# Patient Record
Sex: MALE | Race: WHITE
[De-identification: names, ages, dates, MRNs, and addresses within clinical notes are randomized per-mention and may not be internally consistent; named-entity substitution may affect disease eponyms.]

---
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
## 2019-09-30 ENCOUNTER — HOSPITAL ENCOUNTER (EMERGENCY)
Dept: HOSPITAL 25 - ED | Age: 19
Discharge: HOME | End: 2019-09-30
Payer: COMMERCIAL

## 2019-09-30 DIAGNOSIS — R05: ICD-10-CM

## 2019-09-30 DIAGNOSIS — R51: ICD-10-CM

## 2019-09-30 DIAGNOSIS — B34.9: Primary | ICD-10-CM

## 2019-09-30 LAB
ALBUMIN SERPL BCG-MCNC: 4.6 G/DL (ref 3.2–5.2)
ALBUMIN/GLOB SERPL: 1.7 {RATIO} (ref 1–3)
ALP SERPL-CCNC: 66 U/L (ref 34–104)
ALT SERPL W P-5'-P-CCNC: 31 U/L (ref 7–52)
ANION GAP SERPL CALC-SCNC: 6 MMOL/L (ref 2–11)
AST SERPL-CCNC: 46 U/L (ref 13–39)
BASOPHILS # BLD AUTO: 0 10^3/UL (ref 0–0.2)
BUN SERPL-MCNC: 14 MG/DL (ref 6–24)
BUN/CREAT SERPL: 15.6 (ref 8–20)
CALCIUM SERPL-MCNC: 9.5 MG/DL (ref 8.6–10.3)
CHLORIDE SERPL-SCNC: 103 MMOL/L (ref 101–111)
EOSINOPHIL # BLD AUTO: 0.2 10^3/UL (ref 0–0.6)
GLOBULIN SER CALC-MCNC: 2.7 G/DL (ref 2–4)
GLUCOSE SERPL-MCNC: 101 MG/DL (ref 70–100)
HCO3 SERPL-SCNC: 30 MMOL/L (ref 22–32)
HCT VFR BLD AUTO: 41 % (ref 42–52)
HGB BLD-MCNC: 14.6 G/DL (ref 14–18)
LYMPHOCYTES # BLD AUTO: 2.5 10^3/UL (ref 1–4.8)
MCH RBC QN AUTO: 31 PG (ref 27–31)
MCHC RBC AUTO-ENTMCNC: 35 G/DL (ref 31–36)
MCV RBC AUTO: 89 FL (ref 80–94)
MONOCYTES # BLD AUTO: 0.7 10^3/UL (ref 0–0.8)
NEUTROPHILS # BLD AUTO: 4.3 10^3/UL (ref 1.5–7.7)
NRBC # BLD AUTO: 0 10^3/UL
NRBC BLD QL AUTO: 0
PLATELET # BLD AUTO: 255 10^3/UL (ref 150–450)
POTASSIUM SERPL-SCNC: 3.7 MMOL/L (ref 3.5–5)
PROT SERPL-MCNC: 7.3 G/DL (ref 6.4–8.9)
RBC # BLD AUTO: 4.67 10^6 /UL (ref 4.18–5.48)
SODIUM SERPL-SCNC: 139 MMOL/L (ref 135–145)
WBC # BLD AUTO: 7.7 10^3/UL (ref 3.5–10.8)

## 2019-09-30 PROCEDURE — 99282 EMERGENCY DEPT VISIT SF MDM: CPT

## 2019-09-30 PROCEDURE — 93005 ELECTROCARDIOGRAM TRACING: CPT

## 2019-09-30 PROCEDURE — 80053 COMPREHEN METABOLIC PANEL: CPT

## 2019-09-30 PROCEDURE — 96374 THER/PROPH/DIAG INJ IV PUSH: CPT

## 2019-09-30 PROCEDURE — 36415 COLL VENOUS BLD VENIPUNCTURE: CPT

## 2019-09-30 PROCEDURE — 96361 HYDRATE IV INFUSION ADD-ON: CPT

## 2019-09-30 PROCEDURE — 85025 COMPLETE CBC W/AUTO DIFF WBC: CPT

## 2019-09-30 NOTE — ED
Headache





- HPI Summary


HPI Summary: 





Pt is a 20 y/o M presenting to the ED with a chief complaint of a headache. He 

states he has had cold symptoms recently, including rhinorrhea, congestion, 

cough, and sore throat, but this morning woke up with headache. He notes tunnel 

vision, spots in his vision, difficulty focusing, headache, and 

lightheadedness. He took two Advil and one dose of Mucinex today. Was advised 

by [facility] to come to ED. Currently has mild tension like headache a/w 

pressure behind his eyes. No neck pain, no fevers. 





- History Of Current Complaint


Chief Complaint: EDHeadache


Stated Complaint: HEADACHE/CONGESTED/SORE THROAT PER PT


Time Seen by Provider: 09/30/19 22:22


Hx Obtained From: Patient


Onset/Duration: Gradual Onset, Started hours ago, Still Present


Initially Headache Was: Moderate


Currently Pain Is: Moderate


Timing: Constant, Hours


Character: Typical Headache


Location of Headache: Diffuse


Aggravating Factor: Nothing


Allevating Factors: Nothing


Associated Signs And Symptoms: Visual Changes





- Allergies/Home Medications


Allergies/Adverse Reactions: 


 Allergies











Allergy/AdvReac Type Severity Reaction Status Date / Time


 


No Known Allergies Allergy   Verified 09/30/19 20:52














PMH/Surg Hx/FS Hx/Imm Hx


Previously Healthy: Yes


Endocrine/Hematology History: 


   Denies: Hx Diabetes


Cardiovascular History: 


   Denies: Hx Hypertension


Sensory History: 


   Denies: Hx Contacts or Glasses


Opthamlomology History: 


   Denies: Hx Contacts or Glasses





- Surgical History


Surgery Procedure, Year, and Place: None


Infectious Disease History: No


Infectious Disease History: 


   Denies: Traveled Outside the [place] in Last 30 Days





- Family History


Known Family History: 


   Negative: Cardiac Disease, Hypertension, Diabetes





- Social History


Alcohol Use: Occasionally


Hx Substance Use: No


Substance Use Type: Reports: None


Hx Tobacco Use: No


Smoking Status (MU): Never Smoked Tobacco





Review of Systems


Positive: Other - tunnel vision, spots in his vision


Positive: Sore Throat, Nasal Discharge, Other - congestion


Positive: Cough


Neurological: Other - dizziness, difficulty focusing


Positive: Headache


All Other Systems Reviewed And Are Negative: Yes





Physical Exam





- Summary


Physical Exam Summary: 





Constitutional: Well-developed, Well-nourished, Alert. (-) Distressed


Skin: Warm, Dry


HENT: Normocephalic; Atraumatic


Eyes: Conjunctiva normal, EOMI. PERRL. Visual acuity 20/20 bilaterally. 

Peripheral visual fields intact. 


Neck: Musculoskeletal ROM normal neck. (-) JVD, (-) Stridor, (-) Nuchal rigidity


Cardio: Rhythm regular, rate normal, Heart sounds normal; Intact distal pulses; 

Radial pulses are 2+ and symmetric. (-) Murmur


Pulmonary/Chest wall: Effort normal. (-) Respiratory distress, (-) Wheezes, (-) 

Rales


Abd: Soft, (-) tenderness, (-) Distension, (-) Guarding, (-) Rebound


Musculoskeletal: (-) Edema


Lymph: (-) Cervical adenopathy


Neuro: Alert, Oriented x3


Psych: Mood and affect Normal





Triage Information Reviewed: Yes


Vital Signs On Initial Exam: 


 Initial Vitals











Temp Pulse Resp BP Pulse Ox


 


 98.6 F   75   18   139/88   98 


 


 09/30/19 20:52  09/30/19 20:52  09/30/19 20:52  09/30/19 20:52  09/30/19 20:52











Vital Signs Reviewed: Yes





Diagnostics





- Vital Signs


 Vital Signs











  Temp Pulse Resp BP Pulse Ox


 


 09/30/19 20:52  98.6 F  75  18  139/88  98














- Laboratory


Lab Results: 


 Lab Results











  09/30/19 Range/Units





  22:32 


 


WBC  7.7  (3.5-10.8)  10^3/uL


 


RBC  4.67  (4.18-5.48)  10^6 /uL


 


Hgb  14.6  (14.0-18.0)  g/dL


 


Hct  41 L  (42-52)  %


 


MCV  89  (80-94)  fL


 


MCH  31  (27-31)  pg


 


MCHC  35  (31-36)  g/dL


 


RDW  13  (10-15)  %


 


Plt Count  255  (150-450)  10^3/uL


 


MPV  8.8  (7.4-10.4)  fL


 


Neut % (Auto)  55.4  %


 


Lymph % (Auto)  32.8  %


 


Mono % (Auto)  9.2  %


 


Eos % (Auto)  2.2  %


 


Baso % (Auto)  0.4  %


 


Absolute Neuts (auto)  4.3  (1.5-7.7)  10^3/ul


 


Absolute Lymphs (auto)  2.5  (1.0-4.8)  10^3/ul


 


Absolute Monos (auto)  0.7  (0-0.8)  10^3/ul


 


Absolute Eos (auto)  0.2  (0-0.6)  10^3/ul


 


Absolute Basos (auto)  0.0  (0-0.2)  10^3/ul


 


Absolute Nucleated RBC  0.0  10^3/ul


 


Nucleated RBC %  0.0  











Result Diagrams: 


 09/30/19 22:32





 09/30/19 22:33


Lab Statement: Any lab studies that have been ordered have been reviewed, and 

results considered in the medical decision making process.





- EKG


  ** 2234


Cardiac Rate: NL - 61bpm


EKG Rhythm: Sinus Rhythm


ST Segment: Normal


Ectopy: None


Summary of EKG Findings: EKG at 2234 shows NSR at 61bpm with nml axis, nml 

intervals, and no STEMI. No ischemic changes.





Re-Evaluation





- Re-Evaluation


  ** First Eval


Change: Improved - labs unremarkable, vision 20/20 and 20/25. Plan for 

discharge w symptomatic control w motrin/mucinex/sudafed at home





Headache Course/Dx





- Course


Course Of Treatment: 19-year-old male who presents with URI like symptoms as 

well as headache and reported visual problems.  - PE w 20/20 and 20/25 acuity, 

EOMI, peripheral vision intact. Patient reports visual problems resolved. No 

nuchal rigidity, no fevers, normal white count.  Low suspicion for meningitis. 

No trauma. Not the worst headache of his life. Will defer imaging at this time.

  Patient feels much improved after Toradol, Tylenol and fluids.





- Diagnoses


Provider Diagnoses: 


 Viral syndrome, Headache








Discharge ED





- Sign-Out/Discharge


Documenting (check all that apply): Patient Departure


Patient Received Moderate/Deep Sedation with Procedure: No





- Discharge Plan


Condition: Stable


Disposition: HOME


Patient Education Materials:  Viral Syndrome (ED), General Headache (ED)


Referrals: 


[facility] - Pedro DAVID [Primary Care Provider] - 


Additional Instructions: 


You were seen in the emergency department for headache, and viral symptoms.  

Your labs were unremarkable.  Your visual acuity test was normal.


If any studies were not completed at the time of discharge you will be called 

with the relevant results.


Please follow up with your primary care doctor in next 2-3 days and return to 

emergency department for worsening headaches, fever, neck pain, confusion, or 

concerning symptoms.


It was a pleasure taking care of you today.





- Billing Disposition and Condition


Condition: STABLE


Disposition: Home





- Attestation Statements


Document Initiated by Ricardoibmago: Yes


Documenting Scribe: Wendy Courtney


Provider For Whom Etta is Documenting (Include Credential): Gunjan Kumar MD.


Scribe Attestation: 


Wendy URENA, scribed for Gunjan Kumar MD. on 09/30/19 at 2350. 


Scribe Documentation Reviewed: Yes


Provider Attestation: 


The documentation as recorded by the scribe, Wendy Courtney accurately 

reflects the service I personally performed and the decisions made by me, 

Gunjan Kumar MD.


Status of Scribe Document: Viewed

## 2019-10-01 VITALS — SYSTOLIC BLOOD PRESSURE: 115 MMHG | DIASTOLIC BLOOD PRESSURE: 76 MMHG
